# Patient Record
Sex: MALE | Race: WHITE
[De-identification: names, ages, dates, MRNs, and addresses within clinical notes are randomized per-mention and may not be internally consistent; named-entity substitution may affect disease eponyms.]

---

## 2018-02-06 ENCOUNTER — HOSPITAL ENCOUNTER (OUTPATIENT)
Dept: HOSPITAL 45 - C.MRI | Age: 68
Discharge: HOME | End: 2018-02-06
Attending: PSYCHIATRY & NEUROLOGY
Payer: COMMERCIAL

## 2018-02-06 DIAGNOSIS — M54.17: Primary | ICD-10-CM

## 2018-02-06 DIAGNOSIS — M51.36: ICD-10-CM

## 2018-02-06 DIAGNOSIS — M79.671: ICD-10-CM
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
## 2018-02-06 NOTE — DIAGNOSTIC IMAGING REPORT
MRI OF THE LUMBAR SPINE WITHOUT IV CONTRAST



CLINICAL HISTORY: Lumbar radiculopathy. Neuropathy of the right foot.



COMPARISON STUDY: No priors.



TECHNIQUE: MRI of the lumbar spine is performed utilizing various T1 and

T2-weighted sequences in the axial and sagittal planes. IV contrast was not

administered for this examination.



FINDINGS:



Lumbar spine: Vertebral body height and alignment are maintained throughout the

lumbar spine. There is mild straightening of the lumbar lordosis. Marrow signal

intensity is slightly heterogeneous. No destructive bony lesion is seen. The

transverse and spinous processes are intact as visualized. There is no evidence

of spondylolysis. No destructive bony process is identified. Tiny anterior

osteophytes are seen throughout. Mild chronic degenerative endplate change is

seen at several levels. A tiny hemangioma is suggested in the body of L3.



Intervertebral discs: Degenerative disc desiccation is seen throughout the

lumbar spine. Mild loss of height is seen at L4-L5 and L5-S1.



Spinal cord: The visualized spinal cord is normal in morphology and signal

intensity. The conus medullaris terminates at the T12-L1 interspace. The nerve

roots of the cauda equina are normal in morphology.



L1-L2: Unremarkable.



L2-L3: Unremarkable.



L3-L4: Unremarkable.



L4-L5: There is a small posterior disc bulge. There is no significant acquired

compromise of the central canal. There is mild bilateral subarticular stenosis.

This likely impinges on the exiting right L4 nerve root. This also abuts the

transiting bilateral L5 nerve roots.



L5-S1: There is minimal posterior disc bulge. The central canal is patent. There

is mild bilateral subarticular stenosis, right greater than left. The neural

foramina are patent.



Sacrum: The visualized sacrum is normal in morphology and signal intensity.



Soft tissues: The paraspinous soft tissues are normal in appearance. The

partially visualized retroperitoneal structures are grossly unremarkable but

incompletely assessed.





IMPRESSION:



1. There is no disc herniation or significant acquired compromise of the central

canal.



2. Degenerative disc disease as above, greatest at L4-L5. See discussion for

detailed level by level analysis.



3. No destructive bony lesion is seen.







Dictated:  2/6/2018 2:53 PM

Transcribed:  2/6/2018 3:46 PM

[facility]







Electronically signed by:  Hood Garcia M.D.

2/7/2018 4:29 PM



Dictated Date/Time:  2/6/2018 2:53 PM